# Patient Record
Sex: MALE | Race: WHITE | NOT HISPANIC OR LATINO
[De-identification: names, ages, dates, MRNs, and addresses within clinical notes are randomized per-mention and may not be internally consistent; named-entity substitution may affect disease eponyms.]

---

## 2017-11-28 ENCOUNTER — TRANSCRIPTION ENCOUNTER (OUTPATIENT)
Age: 42
End: 2017-11-28

## 2017-12-28 ENCOUNTER — TRANSCRIPTION ENCOUNTER (OUTPATIENT)
Age: 42
End: 2017-12-28

## 2018-02-04 ENCOUNTER — TRANSCRIPTION ENCOUNTER (OUTPATIENT)
Age: 43
End: 2018-02-04

## 2018-04-15 ENCOUNTER — TRANSCRIPTION ENCOUNTER (OUTPATIENT)
Age: 43
End: 2018-04-15

## 2018-05-03 PROBLEM — Z00.00 ENCOUNTER FOR PREVENTIVE HEALTH EXAMINATION: Status: ACTIVE | Noted: 2018-05-03

## 2018-05-06 ENCOUNTER — APPOINTMENT (OUTPATIENT)
Dept: MRI IMAGING | Facility: CLINIC | Age: 43
End: 2018-05-06
Payer: COMMERCIAL

## 2018-05-06 ENCOUNTER — APPOINTMENT (OUTPATIENT)
Dept: MRI IMAGING | Facility: CLINIC | Age: 43
End: 2018-05-06

## 2018-05-06 ENCOUNTER — OUTPATIENT (OUTPATIENT)
Dept: OUTPATIENT SERVICES | Facility: HOSPITAL | Age: 43
LOS: 1 days | End: 2018-05-06
Payer: COMMERCIAL

## 2018-05-06 DIAGNOSIS — Z00.8 ENCOUNTER FOR OTHER GENERAL EXAMINATION: ICD-10-CM

## 2018-05-06 PROCEDURE — 72141 MRI NECK SPINE W/O DYE: CPT | Mod: 26

## 2018-05-06 PROCEDURE — 73221 MRI JOINT UPR EXTREM W/O DYE: CPT | Mod: 26,LT

## 2018-05-06 PROCEDURE — 72148 MRI LUMBAR SPINE W/O DYE: CPT

## 2018-05-06 PROCEDURE — 72148 MRI LUMBAR SPINE W/O DYE: CPT | Mod: 26

## 2018-05-06 PROCEDURE — 72141 MRI NECK SPINE W/O DYE: CPT

## 2018-05-06 PROCEDURE — 73221 MRI JOINT UPR EXTREM W/O DYE: CPT

## 2019-01-04 ENCOUNTER — TRANSCRIPTION ENCOUNTER (OUTPATIENT)
Age: 44
End: 2019-01-04

## 2019-02-26 ENCOUNTER — TRANSCRIPTION ENCOUNTER (OUTPATIENT)
Age: 44
End: 2019-02-26

## 2019-04-24 ENCOUNTER — EMERGENCY (EMERGENCY)
Facility: HOSPITAL | Age: 44
LOS: 1 days | Discharge: HOME | End: 2019-04-24
Attending: EMERGENCY MEDICINE
Payer: COMMERCIAL

## 2019-04-24 VITALS
SYSTOLIC BLOOD PRESSURE: 122 MMHG | HEIGHT: 66 IN | WEIGHT: 160.06 LBS | OXYGEN SATURATION: 97 % | DIASTOLIC BLOOD PRESSURE: 81 MMHG | HEART RATE: 94 BPM | RESPIRATION RATE: 20 BRPM | TEMPERATURE: 98 F

## 2019-04-24 PROCEDURE — 99283 EMERGENCY DEPT VISIT LOW MDM: CPT

## 2019-04-24 PROCEDURE — 99284 EMERGENCY DEPT VISIT MOD MDM: CPT

## 2019-04-24 RX ORDER — IBUPROFEN 200 MG
600 TABLET ORAL ONCE
Qty: 0 | Refills: 0 | Status: COMPLETED | OUTPATIENT
Start: 2019-04-24 | End: 2019-04-24

## 2019-04-24 RX ADMIN — Medication 600 MILLIGRAM(S): at 17:50

## 2019-04-24 NOTE — ED PROVIDER NOTE - PHYSICAL EXAMINATION
Attending note. Patient is alert and in mild distress. Head is normocephalic and atraumatic. Pupils are 3 mm equal reactive. Extraocular muscles are intact. There is no nystagmus. Patient has no midline tenderness to the cervical spine, thoracic or lumbar spine. Patient has paracervical and rhomboid tenderness bilaterally. There is paralumbar tenderness which is greater on the left than the right. There is no CVA tenderness. There is slight tenderness in the anterior chest and without localization. There's no crepitation. Abdomen is soft and nontender. Pelvis is nontender. Patient is moving all extremities with full range of motion without pain or tenderness. Neurologic examination is grossly intact.

## 2019-04-24 NOTE — ED PROVIDER NOTE - MUSCULOSKELETAL MINIMAL EXAM
mild Parathoracic muscle tenderness. No midline spine tenderness. Normal range of motion of the neck without any difficulty. Normal strength and sensation of bilateral extremities.
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
## 2022-06-28 NOTE — ED ADULT TRIAGE NOTE - PAIN: PRESENCE, MLM
Left message for IR schedulers to reach out to patient for an update on ordered procedures. complains of pain/discomfort

## 2022-07-15 ENCOUNTER — TRANSCRIPTION ENCOUNTER (OUTPATIENT)
Age: 47
End: 2022-07-15

## 2022-07-16 ENCOUNTER — EMERGENCY (EMERGENCY)
Facility: HOSPITAL | Age: 47
LOS: 1 days | Discharge: HOME | End: 2022-07-16
Attending: EMERGENCY MEDICINE
Payer: COMMERCIAL

## 2022-07-16 VITALS
DIASTOLIC BLOOD PRESSURE: 81 MMHG | RESPIRATION RATE: 17 BRPM | OXYGEN SATURATION: 99 % | SYSTOLIC BLOOD PRESSURE: 114 MMHG | HEART RATE: 62 BPM | TEMPERATURE: 98 F

## 2022-07-16 VITALS
OXYGEN SATURATION: 100 % | WEIGHT: 164.91 LBS | TEMPERATURE: 98 F | SYSTOLIC BLOOD PRESSURE: 115 MMHG | RESPIRATION RATE: 18 BRPM | HEART RATE: 85 BPM | HEIGHT: 66 IN | DIASTOLIC BLOOD PRESSURE: 75 MMHG

## 2022-07-16 PROCEDURE — 76377 3D RENDER W/INTRP POSTPROCES: CPT | Mod: 26

## 2022-07-16 PROCEDURE — 70450 CT HEAD/BRAIN W/O DYE: CPT | Mod: MA

## 2022-07-16 PROCEDURE — 40650 RPR LIP FTH VERMILION ONLY: CPT

## 2022-07-16 PROCEDURE — 70450 CT HEAD/BRAIN W/O DYE: CPT | Mod: 26,MA

## 2022-07-16 PROCEDURE — 99284 EMERGENCY DEPT VISIT MOD MDM: CPT

## 2022-07-16 PROCEDURE — 99285 EMERGENCY DEPT VISIT HI MDM: CPT | Mod: 25

## 2022-07-16 PROCEDURE — 76377 3D RENDER W/INTRP POSTPROCES: CPT

## 2022-07-16 PROCEDURE — 70486 CT MAXILLOFACIAL W/O DYE: CPT | Mod: 26,MA

## 2022-07-16 PROCEDURE — 70486 CT MAXILLOFACIAL W/O DYE: CPT | Mod: MA

## 2022-07-16 RX ORDER — IBUPROFEN 200 MG
600 TABLET ORAL ONCE
Refills: 0 | Status: COMPLETED | OUTPATIENT
Start: 2022-07-16 | End: 2022-07-16

## 2022-07-16 RX ADMIN — Medication 600 MILLIGRAM(S): at 16:51

## 2022-07-16 RX ADMIN — Medication 600 MILLIGRAM(S): at 18:32

## 2022-07-16 NOTE — ED PROVIDER NOTE - NSFOLLOWUPINSTRUCTIONS_ED_ALL_ED_FT
--take tylenol or motrin as needed for pain. Take tylenol 1000mg every 6 hours alternating with motrin 600 mg every 6 hours (take tylenol or motrin then three hours later take the other then three hours later take the first).  --given that you were in the ED today, we recommend a followup visit with your plastic surgeon within 10-14 days for suture removal  --your diagnosis is: facial lacerations  --return to the ED if your current symptoms worsen, if pus draining from the wounds, if fevers. --take tylenol or motrin as needed for pain. Take tylenol 1000mg every 6 hours alternating with motrin 600 mg every 6 hours (take tylenol or motrin then three hours later take the other then three hours later take the first).  --given that you were in the ED today, we recommend a followup visit with your plastic surgeon within 10-14 days for suture removal  --your diagnosis is: facial lacerations  --return to the ED if your current symptoms worsen, if pus draining from the wounds, if fevers.    Emeka Dominique)  Plastic Surgery; Surgery of the Hand  [address], [address]  [place] [address]  Phone: [phone]  Fax: [phone]  Follow Up Time: 10-14 Days

## 2022-07-16 NOTE — ED PROVIDER NOTE - CARE PROVIDER_API CALL
Emeka Dominique (MD)  Plastic Surgery; Surgery of the Hand  [address]  [place] [address]  Phone: [phone]  Fax: [phone]  Follow Up Time: 7-10 Days

## 2022-07-16 NOTE — ED PROVIDER NOTE - NS ED ROS FT
(+) lacerations  Review of Systems:   GEN: No fever  CV: No chest pain  RESP: No shortness of breath  ABD: No abdominal pain

## 2022-07-16 NOTE — ED PROVIDER NOTE - PHYSICAL EXAMINATION
Physical Exam:   GEN: in no acute distress, AAOx3  HENT: NCAT, MMM, no stridor  EYES: PERRLA, EOMI  RESP: CTAB, no respiratory distress  CV: normal rate and regular rhythm, S1 S2  ABD: soft and NTND  EXT: No edema, No bony deformity of extremities  SKIN: Laceartions (see attending attestation)  NEURO: CN grossly intact, No focal motor or sensory deficits.
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
## 2022-07-16 NOTE — ED PROVIDER NOTE - PATIENT PORTAL LINK FT
You can access the FollowMyHealth Patient Portal offered by [facility] by registering at the following website: [email or web address]. By joining [employer]’s FollowMyHealth portal, you will also be able to view your health information using other applications (apps) compatible with our system. You can access the FollowMyHealth Patient Portal offered by [facility] by registering at the following website: [email or web address]. By joining [employer]’s FollowMyHealth portal, you will also be able to view your health information using other applications (apps) compatible with our system.

## 2022-09-04 ENCOUNTER — NON-APPOINTMENT (OUTPATIENT)
Age: 47
End: 2022-09-04

## 2022-10-04 ENCOUNTER — APPOINTMENT (OUTPATIENT)
Dept: PSYCHIATRY | Facility: CLINIC | Age: 47
End: 2022-10-04

## 2022-10-04 PROCEDURE — 99205 OFFICE O/P NEW HI 60 MIN: CPT

## 2022-10-08 ENCOUNTER — NON-APPOINTMENT (OUTPATIENT)
Age: 47
End: 2022-10-08

## 2022-11-13 NOTE — SOCIAL HISTORY
[FreeTextEntry1] : Born and grew up in [place], [place].  Both parents are , mom used to work as a  for a publishing clearinghouse and his father worked for the [employer] and later had [employer] business also.  Patient reported that his childhood was "normal" and he had good relationship with his parents he has an older sister with whom he also has a good relationship.  Patient reported that he went to [employer] school and used to play baseball, graduated high school in 1993 and did not go to college but instead went to [employer], graduated in 1995.Patient reported that he always had trouble learning and applying himself and had trouble concentrating and always had  tutors after school to help him study.  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  Patient reported that he was a very shy kid and even at home and outside he used to isolate himself and did not want to eat dinner with everyone and preferred to eat dinner in the kitchen etc.: \par Romantic relationships: no long term relationships and no children. \par Trauma:denied \par Legal hx: Patient reported hx of DWI and no other recent or remote hx of legal problems. \par Access to firearms: States he grew up with guns and they have 6 guns at home that they use for hunting, and states they keep them locked in a gun safe.  \par \par

## 2022-11-13 NOTE — PAST MEDICAL HISTORY
[FreeTextEntry1] : Past medical history: \par HLD, no meds\par OTC medications: denies \par TBI: denies\par Seizures: denies \par Migraine HA: denies \par \par Developmental History: \par Pre/linda-palmer problems: Unremarkable per patient knowledge\par Developmental milestones: unremarkable per patient knowledge\par Infections: none per patient knowledge\par Febrile seizures: none per patient knowledge\par

## 2022-11-13 NOTE — PLAN
[FreeTextEntry4] : Discussed with patient his diagnosis, treatment, alternatives to recommended treatment, risk Vs benefits of treatment and no treatment and alternative treatments. \par Medication: recommended starting Lexapro to address anxiety symptoms and he prefers to not start medications at this time and was advised to start psychotherapy to learning coping skill and manage anxiety sx and if either having minimal effect and or anxiety sx are getting worse, agrees to consider medications then. \par SSRI side effects including but not limited to GI side effects and black box warning of SI in patients younger than 24 were discussed.\par Educated patient of importance of remaining abstinent from drugs and alcohol. \par Emergency procedures were discussed: pt. educated to call 911 or go to nearest ER for worsening of symptoms/suicidal/homicidal ideation. \par Recommend individual psychotherapy to learn coping skills \par RTC in 4 weeks or earlier as needed \par Patient given opportunity to ask questions and their questions were answered and they expressed understanding and agreement with above plan.\par \par I stop checked, no controlled substance Rx in past 12 months: Reference #: [id number]\par \par \par I spent a total of 60 minutes for today's visit in evaluating and treating the patient as per above, including: preparing for patient's appointment (review of prior documents, [facility] ), performing a medically necessary exam/evaluation, communicating/counseling/educating the patient ordering medications, documenting clinical information in the EHR\par \par \par

## 2022-11-13 NOTE — FAMILY HISTORY
[FreeTextEntry1] : Psychiatric: \par Sister- HS and college on Zoloft in the past for depression\par Dad- depression, lost job in his 66s, on meds \par Mom- not treatment, anger problems, \par Substance use: none per patient knowledge\par Suicide: none per patient knowledge \par Neurological/cardiac/endocrine/cancer/autoimmune/other familial or hereditary disorders: negative per patient knowledge except\par both parents- HTN and overweight, on blood thinner. \par \par

## 2022-11-13 NOTE — DISCUSSION/SUMMARY
[FreeTextEntry1] : The patient is a 47-year-old man with history of symptoms consistent with generalized anxiety disorder, reports a long history of polysubstance dependence, which he reports is in remission for 7 years in the past when he was prescribed medications he was for most of the time on meds was actively abusing drugs and alcohol.  He comes today to establish care because he is continuing to feel very anxious and tense and not productive and wants to see if he could benefit from medications.
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
## 2022-11-21 ENCOUNTER — APPOINTMENT (OUTPATIENT)
Dept: PSYCHIATRY | Facility: CLINIC | Age: 47
End: 2022-11-21
Payer: COMMERCIAL

## 2022-11-21 DIAGNOSIS — F10.21 ALCOHOL DEPENDENCE, IN REMISSION: ICD-10-CM

## 2022-11-21 DIAGNOSIS — F14.21 COCAINE DEPENDENCE, IN REMISSION: ICD-10-CM

## 2022-11-21 DIAGNOSIS — F41.1 GENERALIZED ANXIETY DISORDER: ICD-10-CM

## 2022-11-21 PROCEDURE — 99214 OFFICE O/P EST MOD 30 MIN: CPT

## 2022-11-26 PROBLEM — F10.21 ALCOHOL DEPENDENCE IN REMISSION: Status: ACTIVE | Noted: 2022-10-04

## 2022-11-26 PROBLEM — F14.21 COCAINE DEPENDENCE IN REMISSION: Status: ACTIVE | Noted: 2022-10-04

## 2022-11-26 PROBLEM — F41.1 GENERALIZED ANXIETY DISORDER: Status: ACTIVE | Noted: 2022-10-04

## 2022-11-26 NOTE — SOCIAL HISTORY
[FreeTextEntry1] : Born and grew up in [place], [place].  Both parents are , mom used to work as a  for a publishing clearinghouse and his father worked for the [employer] and later had [employer] business also.  Patient reported that his childhood was "normal" and he had good relationship with his parents he has an older sister with whom he also has a good relationship.  Patient reported that he went to [employer] school and used to play baseball, graduated high school in 1993 and did not go to college but instead went to [employer] school, graduated in 1995.Patient reported that he always had trouble learning and applying himself and had trouble concentrating and always had  tutors after school to help him study.  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  Patient reported that he was a very shy kid and even at home and outside he used to isolate himself and did not want to eat dinner with everyone and preferred to eat dinner in the kitchen etc.: \par Romantic relationships: no long term relationships and no children. \par Trauma:denied \par Legal hx: Patient reported hx of DWI and no other recent or remote hx of legal problems. \par Access to firearms: States he grew up with guns and they have 6 guns at home that they use for hunting, and states they keep them locked in a gun safe.  \par \par

## 2022-11-26 NOTE — DISCUSSION/SUMMARY
[FreeTextEntry1] : The patient is a 47-year-old man with history of symptoms consistent with generalized anxiety disorder, reports a long history of polysubstance dependence, which he reports is in remission for 7 years in the past when he was prescribed medications he was for most of the time on meds was actively abusing drugs and alcohol.  He comes today to establish care because he is continuing to feel very anxious and tense and not productive and wants to see if he could benefit from medications.\par \par 11/21/2022: Patient states he was much more relaxed while he was on vacation in [place], and for past 2 weeks symptoms of HEVER are coming back, and he prefers to start with therapy, and consider meds if needed
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
## 2022-11-26 NOTE — PLAN
[Medication education provided] : Medication education provided. [FreeTextEntry5] : Psychoeducation and supportive therapy provided,  and discussed rationale for recommended med changes \par  Medication: recommended starting Lexapro to address anxiety symptoms and he prefers to not start medications at this time and was advised to start psychotherapy to learning coping skill and manage anxiety sx and if either having minimal effect and or anxiety sx are getting worse, agrees to consider medications then. \par SSRI side effects including but not limited to GI side effects and black box warning of SI in patients younger than 24 were discussed.\par Educated patient of importance of remaining abstinent from drugs and alcohol. \par Emergency procedures were discussed: pt. educated to call 911 or go to nearest ER for worsening of symptoms/suicidal/homicidal ideation. \par Recommend individual psychotherapy to learn coping skills \par RTC in 2-3 months or earlier as needed \par Patient given opportunity to ask questions and their questions were answered and they expressed understanding and agreement with above plan.\par \par I stop checked, no controlled substance Rx in past 12 months: Reference #: [id number]\par \par \par I spent a total of 30 minutes for today's visit in evaluating and treating the patient as per above, including: preparing for patient's appointment (review of prior documents, [place] ), performing a medically necessary exam/evaluation, communicating/counseling/educating the patient ordering medications, documenting clinical information in the EHR\par \par \par

## 2023-01-16 NOTE — DISCUSSION/SUMMARY
[FreeTextEntry1] : The patient is a 47-year-old man with history of symptoms consistent with generalized anxiety disorder, reports a long history of polysubstance dependence, which he reports is in remission for 7 years in the past when he was prescribed medications he was for most of the time on meds was actively abusing drugs and alcohol.  He comes today to establish care because he is continuing to feel very anxious and tense and not productive and wants to see if he could benefit from medications.\par \par 11/21/2022: Patient states he was much more relaxed while he was on vacation in [place], and for past 2 weeks symptoms of HEVER are coming back, and he prefers to start with therapy, and consider meds if needed

## 2023-01-16 NOTE — PLAN
[Medication education provided] : Medication education provided. [FreeTextEntry5] : Psychoeducation and supportive therapy provided,  and discussed rationale for recommended med changes \par  Medication: recommended starting Lexapro to address anxiety symptoms and he prefers to not start medications at this time and was advised to start psychotherapy to learning coping skill and manage anxiety sx and if either having minimal effect and or anxiety sx are getting worse, agrees to consider medications then. \par SSRI side effects including but not limited to GI side effects and black box warning of SI in patients younger than 24 were discussed.\par Educated patient of importance of remaining abstinent from drugs and alcohol. \par Emergency procedures were discussed: pt. educated to call 911 or go to nearest ER for worsening of symptoms/suicidal/homicidal ideation. \par Recommend individual psychotherapy to learn coping skills \par RTC in 2-3 months or earlier as needed \par Patient given opportunity to ask questions and their questions were answered and they expressed understanding and agreement with above plan.\par \par I stop checked, no controlled substance Rx in past 12 months: Reference #: [id number]\par \par \par \par \par \par

## 2023-01-16 NOTE — HISTORY OF PRESENT ILLNESS
[FreeTextEntry1] : The patient states he did not yet find a therapist. He states he did go to [place] for 5 days, visited his GF whom she had been dating online. He states he was there from Nov 1st to Nov 6th, went to [facility]. He states he felt much relaxed and much less irritable and states his GF also noticed that and mentioned it to him. He states since he is back to [place] he is feeling tensed and anxious. he states he wants to buy his parents house, but they are telling him that they will be staying in the house,states he does not have really have a good relationship with his parents and states he does not know who to communicate with them and set boundaries. He states he is still not sure if he prefers therapy over meds. Informed patient that therapy and meds are not mutually exclusive and he may do both, or start with therapy, as his HEVER symptoms\par are mild as per HEVER 7 from last visit. \par Patient states he continues to remain abstinent from cannabis, ETOH and or other illicit drugs. \par

## 2023-01-17 ENCOUNTER — APPOINTMENT (OUTPATIENT)
Dept: PSYCHIATRY | Facility: CLINIC | Age: 48
End: 2023-01-17

## 2023-09-12 ENCOUNTER — OUTPATIENT (OUTPATIENT)
Dept: OUTPATIENT SERVICES | Facility: HOSPITAL | Age: 48
LOS: 1 days | End: 2023-09-12
Payer: COMMERCIAL

## 2023-09-12 ENCOUNTER — APPOINTMENT (OUTPATIENT)
Dept: MRI IMAGING | Facility: CLINIC | Age: 48
End: 2023-09-12
Payer: COMMERCIAL

## 2023-09-12 DIAGNOSIS — Z00.8 ENCOUNTER FOR OTHER GENERAL EXAMINATION: ICD-10-CM

## 2023-09-12 PROCEDURE — 73221 MRI JOINT UPR EXTREM W/O DYE: CPT | Mod: 26,RT

## 2023-09-12 PROCEDURE — 73221 MRI JOINT UPR EXTREM W/O DYE: CPT

## 2023-12-10 ENCOUNTER — NON-APPOINTMENT (OUTPATIENT)
Age: 48
End: 2023-12-10

## 2024-01-31 ENCOUNTER — NON-APPOINTMENT (OUTPATIENT)
Age: 49
End: 2024-01-31

## 2025-01-16 ENCOUNTER — NON-APPOINTMENT (OUTPATIENT)
Age: 50
End: 2025-01-16